# Patient Record
Sex: MALE | Race: WHITE
[De-identification: names, ages, dates, MRNs, and addresses within clinical notes are randomized per-mention and may not be internally consistent; named-entity substitution may affect disease eponyms.]

---

## 2018-11-14 ENCOUNTER — HOSPITAL ENCOUNTER (OUTPATIENT)
Dept: HOSPITAL 89 - US | Age: 73
End: 2018-11-14
Attending: UROLOGY
Payer: MEDICARE

## 2018-11-14 DIAGNOSIS — N28.1: ICD-10-CM

## 2018-11-14 DIAGNOSIS — R31.0: ICD-10-CM

## 2018-11-14 DIAGNOSIS — N20.0: Primary | ICD-10-CM

## 2018-11-14 PROCEDURE — 76705 ECHO EXAM OF ABDOMEN: CPT

## 2018-11-14 NOTE — RADIOLOGY IMAGING REPORT
FACILITY: [facility] [place] 

 

PATIENT NAME: Edvin Rasheed

: 1945

MR: [id number]

V: [id number]

EXAM DATE: 

ORDERING PHYSICIAN: RENETTA MERA

TECHNOLOGIST: 

 

Location: [facility] [place]

Patient: Edvin Rasheed

: 1945

MRN: [id number]

Visit/Account:[id number]

Date of Sevice: 2018

 

ACCESSION #: [id number]

 

KIDNEYS

 

HISTORY:  Intermittent hematuria

 

COMPARISON:  None.

 

FINDINGS:

Kidneys:

Right kidney- 10.8 x 6.8 x 6.9 cm with normal parenchymal thickness and echogenicity.  No ultrasound 
evident renal mass lesion or stone.

Left kidney- 10.0 x 6.4 x 4.9 cm with normal parenchymal thickness and echogenicity.  Within the lowe
r pole of the left kidney there is a 1.7 cm shadowing focus consistent with an obstructing stone.  Th
ere is a slightly complex cyst with debris present within the lower pole cortex measuring 1.8 cm

 

Uniform and symmetric blood flow in each kidney by Doppler ultrasound.

Hydronephrosis: None.

 

Bladder: Morphologically unremarkable.  Bilateral ureteric jets visualized.  There is a moderate post
 void residual 47 mL.

 

Abdominal aorta and IVC: Patent by Doppler ultrasound.

 

IMPRESSION:

1.7 cm left lower pole stone with no evidence of obstruction

 

1.8 cm left lower pole cortical cyst with internal debris, Bosniak classification II

 

Report Dictated By: Keon Lizama at 2018 4:55 PM

 

Report E-Signed By: Keon Lizama  at 2018 4:59 PM

 

WSN:[email or web address]

## 2018-11-15 ENCOUNTER — HOSPITAL ENCOUNTER (OUTPATIENT)
Dept: HOSPITAL 89 - CT | Age: 73
LOS: 1 days | Discharge: HOME | End: 2018-11-16
Attending: UROLOGY
Payer: MEDICARE

## 2018-11-15 DIAGNOSIS — N20.0: ICD-10-CM

## 2018-11-15 DIAGNOSIS — R31.9: Primary | ICD-10-CM

## 2018-11-15 DIAGNOSIS — N28.1: ICD-10-CM

## 2018-11-15 PROCEDURE — 82565 ASSAY OF CREATININE: CPT

## 2018-11-15 PROCEDURE — 36415 COLL VENOUS BLD VENIPUNCTURE: CPT

## 2018-11-15 PROCEDURE — 74178 CT ABD&PLV WO CNTR FLWD CNTR: CPT

## 2018-11-16 NOTE — RADIOLOGY IMAGING REPORT
FACILITY: [facility] 

 

PATIENT NAME: Edvin Rasheed

: 1945

MR: [id number]

V: [id number]

EXAM DATE: 

ORDERING PHYSICIAN: RENETTA MERA

TECHNOLOGIST: 

 

Location: [facility] [place]

Patient: Edvin Rasheed

: 1945

MRN: [id number]

Visit/Account:[id number]

Date of Sevice: 2018

 

ACCESSION #: [id number]

 

EXAMINATION:

CT IVP

 

HISTORY:   Hematuria.  Renal stones and complex cyst

 

TECHNIQUE:  CT IVP Protocol: A non-intravenous contrast enhanced spiral scan was obtained of the kidn
eys, ureters and bladder. This was followed by an intravenous contrast enhanced spiral scan through t
he kidneys, ureters and bladder obtained between 7 and 8 minutes post injection in the supine positio
n.

Contrast:   125 mL of IV Isovue-370.

 

One of the following dose optimization techniques was utilized in the performance of this exam: Autom
ated exposure control; adjustment of the mA and/or kV according to the patient's size; or use of an i
terative  reconstruction technique.  Specific details can be referenced in the facility's radiology C
T exam operational policy.

 

COMPARISON STUDIES:   Renal ultrasound 2018

 

FINDINGS:

 

Kidneys:  Two stones in the left kidney lower pole are nonobstructing, measuring three and 11 mm grea
test diameter.  Left kidney lateral lower pole 15 mm simple cyst correlates to the finding on the mode
al ultrasound.  CT images demonstrate no internal complexity.

Ureters:  Negative

Bladder:  There are no bladder stones.  Bladder wall is uniform.  No bladder filling defects are seen
.

 

Liver / biliary:  Negative

Pancreas: Negative

Spleen: Negative

Adrenal glands:  Negative

Pelvic  structures:  Negative

 

Bowel / peritoneum / mesenteries: Small hiatal hernia.

 

Vessels: Mild mural calcification of the abdominal aorta and common iliac arteries.

 

Musculoskeletal / Body wall:  Moderate generalized degenerative disc changes throughout the lower tho
racic and lumbar spine.  Moderate facet bone hypertrophy bilateral L3-4 and right L4-5.

 

Lymph node assessment: Negative

 

Lower chest: Negative

 

IMPRESSION:

 

1.  Left nonobstructing nephrolithiasis.

2.  Left kidney lower pole complicated, 1.5 cm benign cyst by ultrasound, with no concerning features
 on CT imaging.

 

 

 

Report Dictated By: Maria Isabel Parkeh MD at 2018 9:46 AM

 

Report E-Signed By: Maria Isabel Parekh MD  at 2018 10:15 AM

 

WSN:[email or web address]

## 2018-11-16 NOTE — HISTORY AND PHYSICAL
DATE OF ADMISSION:  November 26, 2018





CHIEF COMPLAINT  

Gross hematuria with history of left kidney stones.  



HISTORY OF PRESENT ILLNESS 

Patient is a 73-year-old white male who has a long history of BPH, followed in 

the Urology Clinic, recently presenting for his annual followup.  He was 

complaining of some intermittent gross hematuria for the past year.  His 

urinalysis was normal microscopically; however, he had a significant postvoid 

residual, and on a second void, he was noted to have a small amount of clots in 

the toilet after voiding.  Therefore, a renal ultrasound was performed which 

showed some large left lower pole stones with a complex cyst.  This was followed

by a CT urogram which showed three stones in the left lower pole, one measuring 

approximately 3 x 3 mm and two closely adjacent to each other with one measuring

8 x 6 and the other measuring 10 x 8 mm.  He is now being brought to the 

operating room for planning stent placement, followed by extracorporeal shock 

wave lithotripsy.  



PAST MEDICAL HISTORY 

1.  Gastroesophageal reflux disease. 

2.  BPH.

3.  Left inguinal hernia. 

4.  Tinnitus.

5.  Hypercholesterolemia.



PAST SURGICAL HISTORY 

1.  Tonsillectomy.

2.  Transurethral microwave thermal therapy of the prostate 2003.

3.  Laser photovaporization of prostate 2008.  

4.  Transurethral resection of bladder neck contracture 2009.

5.  ACL repair.

6.  Right shoulder repair. 

7.  Removal of left ear cyst.  



CURRENT MEDICATIONS 

1.  Pepcid.

2.  Lipitor.

3.  Erika [place].  



ALLERGIES 

No known drug allergies. 



SOCIAL HISTORY 

Patient is  and lives in [place], [place].  He denies tobacco use.  



FAMILY HISTORY 

Noncontributory.  



REVIEW OF SYSTEMS 

Patient denies chest pain, shortness of breath, productive cough, fever, chills,

nausea, vomiting, flank or abdominal pain, change of bowel habits, liver 

disease, or chronic headaches.  



PHYSICAL EXAMINATION 

GENERAL:  Patient is a well-developed, well-nourished, white male in no acute 

distress.  

HEENT:  Normocephalic, atraumatic. 

CHEST:  Clear to auscultation bilaterally. 

CARDIOVASCULAR:  Regular rate and rhythm.

ABDOMINAL:  Soft, nontender.  No masses are palpated.  

GENITOURINARY:  Deferred to the OR.

EXTREMITIES:  Without clubbing, cyanosis, or edema.

NEUROLOGIC:  Nonfocal.



IMPRESSION

A 73-year-old white male with a history of gross hematuria intermittently for 

the past year, noted to have left renal calculi.  



PLAN 

Will perform anesthetic cystoscopy, stent placement, followed by extracorporeal 

shock wave lithotripsy on the left kidney.  

LORI

## 2018-11-20 LAB — PLATELET COUNT, AUTOMATED: 229 K/UL (ref 150–450)

## 2018-11-26 ENCOUNTER — HOSPITAL ENCOUNTER (OUTPATIENT)
Dept: HOSPITAL 89 - OR | Age: 73
Discharge: HOME | End: 2018-11-26
Attending: UROLOGY
Payer: MEDICARE

## 2018-11-26 VITALS — DIASTOLIC BLOOD PRESSURE: 66 MMHG | SYSTOLIC BLOOD PRESSURE: 129 MMHG

## 2018-11-26 VITALS — BODY MASS INDEX: 25.03 KG/M2 | HEIGHT: 69 IN | WEIGHT: 169 LBS

## 2018-11-26 VITALS — SYSTOLIC BLOOD PRESSURE: 144 MMHG | DIASTOLIC BLOOD PRESSURE: 70 MMHG

## 2018-11-26 VITALS — SYSTOLIC BLOOD PRESSURE: 148 MMHG | DIASTOLIC BLOOD PRESSURE: 79 MMHG

## 2018-11-26 VITALS — SYSTOLIC BLOOD PRESSURE: 120 MMHG | DIASTOLIC BLOOD PRESSURE: 71 MMHG

## 2018-11-26 DIAGNOSIS — N32.9: ICD-10-CM

## 2018-11-26 DIAGNOSIS — N30.90: ICD-10-CM

## 2018-11-26 DIAGNOSIS — N20.0: Primary | ICD-10-CM

## 2018-11-26 PROCEDURE — 81001 URINALYSIS AUTO W/SCOPE: CPT

## 2018-11-26 PROCEDURE — 74018 RADEX ABDOMEN 1 VIEW: CPT

## 2018-11-26 PROCEDURE — 36415 COLL VENOUS BLD VENIPUNCTURE: CPT

## 2018-11-26 PROCEDURE — 52332 CYSTOSCOPY AND TREATMENT: CPT

## 2018-11-26 PROCEDURE — 88305 TISSUE EXAM BY PATHOLOGIST: CPT

## 2018-11-26 PROCEDURE — 52224 CYSTOSCOPY AND TREATMENT: CPT

## 2018-11-26 PROCEDURE — 50590 FRAGMENTING OF KIDNEY STONE: CPT

## 2018-11-26 PROCEDURE — 87088 URINE BACTERIA CULTURE: CPT

## 2018-11-26 PROCEDURE — 85025 COMPLETE CBC W/AUTO DIFF WBC: CPT

## 2018-11-26 NOTE — OPERATIVE REPORT 1
EVENT DATE: November 26, 2018

SURGEON: River Mai MD 

ANESTHESIOLOGIST: Edvin Jennings MD 

ANESTHESIA: General.





PREOPERATIVE DIAGNOSES  

1.  Left lower pole renal calculi.

2.  History of hematuria.



POSTOPERATIVE DIAGNOSES 

1.  Left lower pole renal calculi.

2.  History of hematuria.

3.  1 to 3 mm submucosal bladder lesions at base, numbering five.



PROCEDURES PERFORMED 

1.  Cystoscopy.

2.  Cold-cut bladder biopsy x1 with fulguration of lesions x4.

3.  Left internal double-J ureteral stent placement.

4.  Left lower pole extracorporeal shock wave lithotripsy.



ESTIMATED BLOOD LOSS 

Minimal. 



IV FLUIDS

Crystalloids.



DRAINS

6-Burmese x 26 cm Contour microinvasive stent on left.



PATHOLOGY

Cold-cut biopsy for permanent analysis.



COMPLICATIONS 

None.



CONDITION 

The patient was taken to recovery room awake and in stable condition.



STATEMENT OF MEDICAL NECESSITY

The patient is a 73-year-old white male with a long history of BPH who more 

recently began to experience some intermittent gross hematuria.  CT IVP was 

performed, which showed left lower pole ureteral stones. He had a 3 x 3 mm stone

in addition to a larger 13 x 8 stone.  Both were of low Hounsfield units of 

approximately 400.  He is now being brought to the operating room for planned 

anesthetic cystoscopy and to be followed by stent placement with lithotripsy. 



DESCRIPTION OF OPERATION PERFORMED

The patient was brought to the operating room after general anesthetic was 

obtained.  He was placed in the dorsal lithotomy position on the lithotripsy 

table, prepped and draped posteriorly.  Anesthetic cystoscopy was performed with

the 21-Burmese rigid sheath and both 30 and 70-degree lenses.  He had a normal 

appearing pendulous bulbar, membranous urethra. His prostatic fossa was opened 

and his bladder neck appeared normal.  He had some mildly friable vessels at the

bladder neck.  Upon entering the bladder, he had a 1+ trabeculated bladder with 

split-like ureteral orifices, both effluxing clear urine.  There were 

approximately five small submucosal areas of darker pigmentation and dome in 

shape.  They range from 1 to 3 mm in size.  The one at the floor was 

excisionally biopsied with the cold-cut biopsy forceps and sent for permanent 

analysis. The Bovie electro was then used to fulgurate this biopsy area and to 

fulgurate the other small lesions.  Following this, the left ureteral orifice 

was cannulated with an open-end access catheter, which was used to place a wire 

in the renal pelvis on the left side.  The incision was used to place a 6-Burmese

x 26 cm Contour stent.  The wire was removed.  He was noted to have good curling

int he bladder by direct vision and good curling in the lower pole calyx by 

fluoroscopic imaging.  The patient's bladder was then drained through the 

cystoscopic sheath.  He was then repositioned to supine for lithotripsy.  A 

stent was placed in the lithotripsy cross-hairs and a two plane treatment gun 

set at power setting of two and increased to power setting of three.  A three 

minute pause was then performed and treatment resumed. Over the course of three 

gun shocks, the power was gradually increased to a power setting of eight. 

Intermittent two-plan fluoroscopy was used to ensure the cross-hairs were 

remaining on the stone and stone fragment pile.  At the conclusion of the 

treatment, no significant fragments could be identified. The patient was 

awakened in the operating room and taken to the recovery area, awake and in 

stable condition.



PLAN

We will allow the patient to be discharged home today on Colace, Norco, Motrin, 

Ditropan XL, Flomax and Pyridium.  We will plan to see him in the Urology Clinic

in followup in approximately four to six weeks with a low-dose CT scan to 

evaluate treatment results and review his pathology.



LORI

## 2018-11-26 NOTE — RADIOLOGY IMAGING REPORT
FACILITY: [facility] 

 

PATIENT NAME: Edvin Rasheed

: 1945

MR: [id number]

V: [id number]

EXAM DATE: 

ORDERING PHYSICIAN: RENETTA MERA

TECHNOLOGIST: 

 

Location: [facility] [place]

Patient: Edvin Rasheed

: 1945

MRN: [id number]

Visit/Account:[id number]

Date of Sevice: 2018

 

ACCESSION #: [id number]

 

INDICATION:  KIDNEY STONES

 

EXAM DATE:  2018 1:57 PM

 

COMPARISON: CT 2018.

 

FINDINGS:

Single AP supine images of the abdomen.

 

8mm density over the midportion of the left kidney and 4 mm density over the inferior pole of the lef
t kidney may correspond to previously seen calculi, though they're much better demonstrated on CT.

 

Bowel gas pattern is nonobstructive. No pneumatosis, pneumoperitoneum or portal venous gas. No eviden
ce of large volume ascites or mass.

 

No acute osseous abnormality.

 

 

IMPRESSION: Densities projecting over the left kidney may correspond to previously seen calculi, bett
er demonstrated on CT.  No evidence of ureteral calculus.

 

 

Report Dictated By: Josafat Mi MD at 2018 7:07 AM

 

Report E-Signed By: Josafat Mi MD  at 2018 7:09 AM

 

WSN:[id number]

## 2019-01-22 ENCOUNTER — HOSPITAL ENCOUNTER (OUTPATIENT)
Dept: HOSPITAL 89 - CT | Age: 74
End: 2019-01-22
Attending: UROLOGY
Payer: MEDICARE

## 2019-01-22 DIAGNOSIS — K40.20: ICD-10-CM

## 2019-01-22 DIAGNOSIS — K44.9: ICD-10-CM

## 2019-01-22 DIAGNOSIS — N20.0: Primary | ICD-10-CM

## 2019-01-22 PROCEDURE — 74176 CT ABD & PELVIS W/O CONTRAST: CPT

## 2019-01-22 NOTE — RADIOLOGY IMAGING REPORT
FACILITY: [facility] [place] 

 

PATIENT NAME: Edvin Rasheed

: 1945

MR: [id number]

V: [id number]

EXAM DATE: 

ORDERING PHYSICIAN: RENETTA MERA

TECHNOLOGIST: 

 

Location: [facility] [place]

Patient: Edvin Rasheed

: 1945

MRN: [id number]

Visit/Account:[id number]

Date of Sevice:  2019

 

ACCESSION #: [id number]

 

CT ABDOMEN PELVIS W/O CON

 

HISTORY:  History kidney stones, follow-up exam

 

TECHNIQUE: Axial images acquired through the abdomen/pelvis.  Coronal and sagittal reformatting also 
performed.  No IV contrast administered.Dose Lowering Technique

 

One of the following dose optimization techniques was utilized in the performance of this exam: Autom
ated exposure control; adjustment of the mA and/or kV according to the patient's size; or use of an i
terative  reconstruction technique.  Specific details can be referenced in the facility's radiology C
T exam operational policy.

 

COMPARISON:  The 2018

 

FINDINGS:

 

Visualized lung bases:  Negative.

 

Hepatobiliary:  Negative.

 

Spleen:  Negative.

 

Adrenals:  Negative.

 

Pancreas:  Negative.

 

Kidneys ureters and bladder: There are multiple calculi in the lower pole calyces of the left kidney.
  The largest measures 1.04 x 0.6 x 1.1 cm  .  1.3 cm a lateral lower pole left renal cyst is again n
oted.  There is no evidence of hydronephrosis or hydroureter

 

Genitalia:  Negative.

 

GI:  There is a small hiatal hernia

 

Vessels/spaces/nodes:  Mild atherosclerotic calcified occasions abdominal aorta and branch vessels

 

Bones/soft tissues:  There are small bilateral inguinal hernias containing fat.  There are mild exten
sive spondylotic changes of the lumbar spine

 

Additional findings:  None pertinent.

 

IMPRESSION:

 

There are multiple nonobstructing calculi seen in the lower pole calyces of the left kidney as descri
bed above

 

Small hiatal hernia

 

Small bilateral inguinal hernias containing fat

 

Report Dictated By: Alisa Berger MD at 2019 2:15 PM

 

Report E-Signed By: Alisa Berger MD  at 2019 2:43 PM

 

WSN:[email or web address]

## 2019-07-16 ENCOUNTER — HOSPITAL ENCOUNTER (OUTPATIENT)
Dept: HOSPITAL 89 - CT | Age: 74
End: 2019-07-16
Attending: UROLOGY
Payer: MEDICARE

## 2019-07-16 DIAGNOSIS — N20.0: Primary | ICD-10-CM

## 2019-07-16 PROCEDURE — 74176 CT ABD & PELVIS W/O CONTRAST: CPT

## 2019-07-16 NOTE — RADIOLOGY IMAGING REPORT
FACILITY: [facility] 

 

PATIENT NAME: Edvin Rasheed

: 1945

MR: [id number]

V: [id number]

EXAM DATE: 

ORDERING PHYSICIAN: RENETTA MERA

TECHNOLOGIST: 

 

Location: 

Patient: Edvin Rasheed

: 1945

MRN: [id number]

Visit/Account:[id number]

Date of Sevice:  2019

 

ACCESSION #: [id number]

 

Study: CT scan of the abdomen and pelvis without intravenous contrast

 

Indication: Flank pain, question urinary stone

 

Comparison study: 2019

 

Technique: Multiple axial images were obtained through the abdomen and pelvis without the use of intr
avenous contrast. Coronal and sagittal reconstructions were made from the original data set.

 

One of the following dose optimization techniques was utilized in the performance of this exam: Autom
ated exposure control; adjustment of the mA and/or kV according to the patient's size; or use of an i
terative  reconstruction technique.  Specific details can be referenced in the facility's radiology C
T exam operational policy.

 

 

Findings:

 

Kidneys and ureters: There is no evidence of stone within the right kidney.  There is no evidence of 
right-sided hydronephrosis.  The right ureter is unremarkable in appearance.

 

The left kidney contains several calcifications consistent with stones.  The largest measures 5 mm an
d is at the lower pole collecting system.

 

On the previous study, there was a 1.0 cm stone present at the lower pole collecting system.  This is
 no longer present.  There is no evidence of left-sided hydronephrosis.  The left

 

Liver: The liver is unremarkable.

 

Spleen: The spleen is unremarkable.

 

Gallbladder: The gallbladder is unremarkable.

 

Pancreas and adrenal glands: Unremarkable

 

Bowel: No abnormalities identified within the large bowel or small bowel.

 

Retroperitoneum: Unremarkable.

 

Pelvis: There are bilateral fat-containing inguinal hernias present.  There is no bowel present withi
n the inguinal hernias.  The urinary bladder is unremarkable.

 

Bony structures: The visualized bony structures are unremarkable.

 

 

 

IMPRESSION: Nonobstructing left-sided renal stones.  The 1.0 cm stone seen on the previous study date
d 2019 is no longer present.

 

Report Dictated By: Cain Peralta at 2019 9:40 AM

 

Report E-Signed By: Cain Peralta  at 2019 9:47 AM

 

WSN:[email or web address]